# Patient Record
Sex: FEMALE | Race: WHITE | NOT HISPANIC OR LATINO | Employment: PART TIME | URBAN - METROPOLITAN AREA
[De-identification: names, ages, dates, MRNs, and addresses within clinical notes are randomized per-mention and may not be internally consistent; named-entity substitution may affect disease eponyms.]

---
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
## 2022-05-17 NOTE — H&P
History and Physical    Franci Singh is a 19 y.o. female  -Para:     Gestational Age:  33w1d  Admitted for:   Antepartum Admission  Admitted to  [facility] Delivery.  Patient received prenatal care: [facility] - CN patient    HPI: Patient is a 18 yo female who has been in and out of triage and clinic in the past week for intermittent abd discomfort. Difficult to determine what is the etiology - has been evaluated and felt not to be acute or surgical abd and not likely  labor. Because of frequent intercourse unable to get FFN until today - seen in clinic w/ FFN positive at 48 hrs since intercourse and w/ ongoing abd discomfort. Sent to triage for evaluation.    PMH  None - no diabetes, no HTN, no asthma    Surgical  Childhood eye surgery    Allergies  Latex    Meds - none    Loss of fluid:   negative  Abdominal Pain:  Not pain but ongoing intermittent discomfort  Uterine Contractions:  uncertain  Vaginal Bleeding:  negative  Fetal Movement:  normal  Patient denies fever, chills, nausea, vomiting , headache, visual disturbance, or dysuria  No LMP recorded. Patient is pregnant.  Estimated Date of Delivery: 10/28/19  Final MONA: 10/28/2019, by Other Basis    Patient Active Problem List    Diagnosis Date Noted   • Round ligament pain 2019       Admitting DX: Pregnancy   labor  Labor and delivery indication for care or intervention  Pregnancy Complications:  none  OB Risk Factors:   none  Labor State:    Not in labor - possible latent labor    History:   has no past medical history of Asthma or Diabetes (HCC).     has a past surgical history that includes eye surgery and other.    OB History    Para Term  AB Living   1             SAB TAB Ectopic Molar Multiple Live Births                    # Outcome Date GA Lbr Alvarez/2nd Weight Sex Delivery Anes PTL Lv   1 Current                Medications:  No current facility-administered medications on file  see cardiac report in the  for today's session.     prior to encounter.      Current Outpatient Medications on File Prior to Encounter   Medication Sig Dispense Refill   • Prenatal Vit-Fe Fumarate-FA (PRENATAL 1 PLUS 1 PO) Take 1 Tab by mouth every day. Indications: Pregnancy     • docusate sodium (COLACE) 100 MG Cap Take 1 Cap by mouth 2 times a day. 60 Cap 1       Allergies:  Other food and Latex    ROS:   Neuro: negative    Cardiovascular: negative  Gastro intestinal: negative  Genitourinary: negative            Physical Exam:  /51   Pulse 94   Temp 36.8 °C (98.2 °F) (Temporal)   Ht 1.524 m (5')   Wt 48.8 kg (107 lb 9.6 oz)   BMI 21.01 kg/m²   Constitutional: thin and young appearing gravid female  No JVD: 30 degrees  HEENT: NCAT, EOMI, neck supple  Cardio: RRR, S1/s2 appreciated  W/ gentle systolic murmur  Lung: CTA bilat w/o crackles/wheezes  Abdomen: gravid - cephalic  Extremity: no edema, skin warm and dry    Cervical Exam: 2/50/-1to-2 and soft and midposition  Fetal Assessment: Cat1 baselin 150, moderate variability w/ accels and no decels  Estimated Fetal Weight: 2500g    BEDSIDE US - cephalic w/ CLINTON > 12      Labs:      Prenatal Results     General (Most Recent Result)     Test Value Date Time    ABO       Rh       Antibody screen       HbA1c       Gonorrhea       Chlamydia  by PCR       Chlamydia by DNA       RPR/Syphilus       HSV 1/2 by PCR (non-serum)       HSV 1/2 (serum)       HSV 1       HSV 2       HPV (16)       HPV (18)       HPV (other)       HBsAg       HIV-1 HIV-2 Antibodies       Rubella       Tb             Pap Smear (Most Recent Result)     Test Value Date Time    Pap smear       Pap smear w/HPV       Pap smear w/CTNG       Pap smar w/HPV CTNG       Pap smear (reflex HPV ACUS)       Pap smear (reflex HPV ASCUS w/CTNG)       Pathology gyn specimen             Urinalysis (Most Recent Result)     Test Value Date Time    Urinalysis       Urinalysis (culture if indicated)       POC urinalysis       Urine drug screen       Urine drug  screen (w/o conf)       Urine culture ([id number])       Urine culture ([id number])  (See Report)   19 1530          1st Trimester     Test Value Date Time    Hgb       Hct       Fasting Glucose Tolerance       GTT, 1 hour       GTT, 2 hours       GTT, 3 hours             2nd Trimester     Test Value Date Time    Hgb       Hct       Urinalysis       Urine Culture       AST       ALT       Uric Acid       Fasting Glucose Tolerance       GTT, 1 hour       GTT, 2 hours       GTT, 3 hours       Urine Protein/Creatinine Ratio             3rd Trimester     Test Value Date Time    Hgb       Hct       Platelet count       GBS (MER[facility] BROTH)       GBS (Direct)       Urinalysis       Urine Culture       Urine Drug Screen       Urine Protein/Creatinine Ratio             Congenital Disease Screening     Test Value Date Time    First Trimester Screen       Quad Screen       Sickle Cell       Thalassemia       [facility]       Cystic Fibrosis Carrier Study                     Assessment:  Gestational Age:  33w1d  Labor State:   Antepartum  Risk Factors:   none  Pregnancy Complications: none    Patient Active Problem List    Diagnosis Date Noted   • Round ligament pain 2019       Plan:   Admitted for: Antepartum Admission for possible early  latent labor - some cervical change and lots of uterine irritability  IV fluids w/ LR @ maintenance  No abx at this time as she does not seem to be imminently delivery - GBS swab pending  Nifedipine 10 mg Q6 hrs for tocolysis  If increasing activity will consider abx and repeat eval  Regular diet  Antepartum admission    Tom Sotelo D.O.     This plan of care was already discussed w/ [facility] attending Dr. Chamberlain - who gave the above recommendations - we will reconsult if we desire them to eval patient or give further rec's    This plan of care was also discussed w/ Dr. Martinez - [facility]  attending - who agreed with these details

## 2024-08-08 ENCOUNTER — HOSPITAL ENCOUNTER (OUTPATIENT)
Dept: LAB | Facility: MEDICAL CENTER | Age: 24
End: 2024-08-08
Attending: OBSTETRICS & GYNECOLOGY
Payer: MEDICAID

## 2024-08-08 LAB
ABO GROUP BLD: NORMAL
BASOPHILS # BLD AUTO: 0.3 % (ref 0–1.8)
BASOPHILS # BLD: 0.03 K/UL (ref 0–0.12)
BLD GP AB SCN SERPL QL: NORMAL
EOSINOPHIL # BLD AUTO: 0.04 K/UL (ref 0–0.51)
EOSINOPHIL NFR BLD: 0.5 % (ref 0–6.9)
ERYTHROCYTE [DISTWIDTH] IN BLOOD BY AUTOMATED COUNT: 47.1 FL (ref 35.9–50)
HBV SURFACE AG SER QL: ABNORMAL
HCT VFR BLD AUTO: 39.8 % (ref 37–47)
HGB BLD-MCNC: 13.4 G/DL (ref 12–16)
HIV 1+2 AB+HIV1 P24 AG SERPL QL IA: NORMAL
IMM GRANULOCYTES # BLD AUTO: 0.01 K/UL (ref 0–0.11)
IMM GRANULOCYTES NFR BLD AUTO: 0.1 % (ref 0–0.9)
LYMPHOCYTES # BLD AUTO: 1.65 K/UL (ref 1–4.8)
LYMPHOCYTES NFR BLD: 19.1 % (ref 22–41)
MCH RBC QN AUTO: 32.6 PG (ref 27–33)
MCHC RBC AUTO-ENTMCNC: 33.7 G/DL (ref 32.2–35.5)
MCV RBC AUTO: 96.8 FL (ref 81.4–97.8)
MONOCYTES # BLD AUTO: 0.45 K/UL (ref 0–0.85)
MONOCYTES NFR BLD AUTO: 5.2 % (ref 0–13.4)
NEUTROPHILS # BLD AUTO: 6.46 K/UL (ref 1.82–7.42)
NEUTROPHILS NFR BLD: 74.8 % (ref 44–72)
NRBC # BLD AUTO: 0 K/UL
NRBC BLD-RTO: 0 /100 WBC (ref 0–0.2)
PLATELET # BLD AUTO: 196 K/UL (ref 164–446)
PMV BLD AUTO: 11.7 FL (ref 9–12.9)
RBC # BLD AUTO: 4.11 M/UL (ref 4.2–5.4)
RH BLD: NORMAL
RUBV AB SER QL: 90.8 IU/ML
T PALLIDUM AB SER QL IA: ABNORMAL
WBC # BLD AUTO: 8.6 K/UL (ref 4.8–10.8)

## 2024-08-08 PROCEDURE — 85025 COMPLETE CBC W/AUTO DIFF WBC: CPT

## 2024-08-08 PROCEDURE — 87340 HEPATITIS B SURFACE AG IA: CPT

## 2024-08-08 PROCEDURE — 36415 COLL VENOUS BLD VENIPUNCTURE: CPT

## 2024-08-08 PROCEDURE — 86900 BLOOD TYPING SEROLOGIC ABO: CPT

## 2024-08-08 PROCEDURE — 87389 HIV-1 AG W/HIV-1&-2 AB AG IA: CPT

## 2024-08-08 PROCEDURE — 86780 TREPONEMA PALLIDUM: CPT

## 2024-08-08 PROCEDURE — 86592 SYPHILIS TEST NON-TREP QUAL: CPT

## 2024-08-08 PROCEDURE — 86850 RBC ANTIBODY SCREEN: CPT

## 2024-08-08 PROCEDURE — 86762 RUBELLA ANTIBODY: CPT

## 2024-08-08 PROCEDURE — 87086 URINE CULTURE/COLONY COUNT: CPT

## 2024-08-08 PROCEDURE — 86901 BLOOD TYPING SEROLOGIC RH(D): CPT

## 2024-08-10 LAB
BACTERIA UR CULT: NORMAL
SIGNIFICANT IND 70042: NORMAL
SITE SITE: NORMAL
SOURCE SOURCE: NORMAL

## 2024-08-22 NOTE — PROGRESS NOTES
19 y.o. , EDC 10/28=32w6d    1300- Report from PAUL Jordan RN. To bedside, sister and FOB present. POC discussed, questions answered. Pt continues to report intermittent pain in R abdomen radiating across upper and lower abdomen. Pt repots pain began three days ago, has not had a BM since then. Pt unable to quantify how often she feels pain. Irregular UCs and irritability noted on TOCO    1400- Dr. Farah to bedside, family asked to step out, pt now admits to intercourse three days ago. Orders received. Medications per MAR, pt encouraged to drink water     1500- Pt reports some relief in pain with Tylenol, but pain still present. Reports some relief in nausea. MD updated in unit, will continue to monitor. Urine to lab for culture    1545- Dr. Farah back to unit, reviewed TOCO tracing. After consulting with attending, orders received from Dr. Farah for SVE now and in one hour. If unchanged, okay to d/c home    1555- SSE FFN collected, SVE 1-2/50/-4, soft/mid-position. Call to Dr. Farah, updated on exam, will consult with attending and call back    1600- Call from Dr. Farah, order to send FFN. To bedside, pt reports she did have intercourse last night, unable to send specimen    1705- SVE 1-2/50/-3, soft/mid-position. Call to Dr. Farah, updated on exam, ctx pattern, pt status, inability to send FFN    1715- After consulting with attending MD, order from Dr. Farah to d/c home with instructions to follow-up with Nancie this week     1730- D/c instructions discussed with pt, FOB and pt's mother including  labor precautions, importance of pelvic rest, and reasons to return to L&D. Pt has an appointment tomorrow with [facility]. Pt advised to  Tylenol and Colace Rxs at [facility] pharmacy. Questions answered. D/c ambulatory off unit at 1735   I left a message on voicemail, giving patient the results below. Patient was encouraged to call back with questions or concerns.